# Patient Record
Sex: FEMALE | Race: WHITE
[De-identification: names, ages, dates, MRNs, and addresses within clinical notes are randomized per-mention and may not be internally consistent; named-entity substitution may affect disease eponyms.]

---

## 2018-10-22 ENCOUNTER — HOSPITAL ENCOUNTER (OUTPATIENT)
Dept: HOSPITAL 53 - M RAD | Age: 43
End: 2018-10-22
Attending: INTERNAL MEDICINE
Payer: COMMERCIAL

## 2018-10-22 DIAGNOSIS — R93.5: Primary | ICD-10-CM

## 2018-10-22 DIAGNOSIS — R11.2: ICD-10-CM

## 2018-10-22 PROCEDURE — 76700 US EXAM ABDOM COMPLETE: CPT

## 2018-10-26 ENCOUNTER — HOSPITAL ENCOUNTER (OUTPATIENT)
Dept: HOSPITAL 53 - M LAB | Age: 43
End: 2018-10-26
Attending: INTERNAL MEDICINE
Payer: COMMERCIAL

## 2018-10-26 DIAGNOSIS — R11.2: Primary | ICD-10-CM

## 2018-10-26 LAB
ALBUMIN/GLOBULIN RATIO: 1 (ref 1–1.93)
ALBUMIN: 3.6 GM/DL (ref 3.2–5.2)
ALKALINE PHOSPHATASE: 101 U/L (ref 45–117)
ALT SERPL W P-5'-P-CCNC: 31 U/L (ref 12–78)
AST SERPL-CCNC: 18 U/L (ref 7–37)
BILIRUB CONJ SERPL-MCNC: < 0.1 MG/DL (ref 0–0.2)
BILIRUBIN,TOTAL: 0.2 MG/DL (ref 0.2–1)
CONTROL LINE HPYORI: (no result)
H PYLORI QUALITATIVE IGG: NEGATIVE
TOTAL PROTEIN: 7.2 GM/DL (ref 6.4–8.2)

## 2018-10-26 PROCEDURE — 80076 HEPATIC FUNCTION PANEL: CPT

## 2018-10-29 ENCOUNTER — HOSPITAL ENCOUNTER (OUTPATIENT)
Dept: HOSPITAL 53 - M OPP | Age: 43
Discharge: HOME | End: 2018-10-29
Attending: INTERNAL MEDICINE
Payer: COMMERCIAL

## 2018-10-29 DIAGNOSIS — N32.81: ICD-10-CM

## 2018-10-29 DIAGNOSIS — Z80.3: ICD-10-CM

## 2018-10-29 DIAGNOSIS — M54.9: ICD-10-CM

## 2018-10-29 DIAGNOSIS — F17.210: ICD-10-CM

## 2018-10-29 DIAGNOSIS — R12: ICD-10-CM

## 2018-10-29 DIAGNOSIS — D64.9: ICD-10-CM

## 2018-10-29 DIAGNOSIS — G43.909: ICD-10-CM

## 2018-10-29 DIAGNOSIS — R10.11: ICD-10-CM

## 2018-10-29 DIAGNOSIS — Z80.0: ICD-10-CM

## 2018-10-29 DIAGNOSIS — K29.70: ICD-10-CM

## 2018-10-29 DIAGNOSIS — K21.9: ICD-10-CM

## 2018-10-29 DIAGNOSIS — Z79.899: ICD-10-CM

## 2018-10-29 DIAGNOSIS — R11.2: Primary | ICD-10-CM

## 2018-10-29 DIAGNOSIS — F41.9: ICD-10-CM

## 2018-10-29 PROCEDURE — 43239 EGD BIOPSY SINGLE/MULTIPLE: CPT

## 2018-10-29 RX ADMIN — SODIUM CHLORIDE 1 MLS/HR: 9 INJECTION, SOLUTION INTRAVENOUS at 13:00

## 2018-10-30 LAB
H PYLORI IGM SER-ACNC: 13.9 UNITS (ref 0–8.9)
IGA SERPL-MCNC: 212 MG/DL (ref 87–352)
IGA1 SER-MCNC: 178 MG/DL (ref 73.2–301.2)
IGA2 SER-MCNC: 73 MG/DL (ref 13.4–97.9)

## 2019-01-10 ENCOUNTER — HOSPITAL ENCOUNTER (OUTPATIENT)
Dept: HOSPITAL 53 - M LAB | Age: 44
End: 2019-01-10
Attending: INTERNAL MEDICINE
Payer: COMMERCIAL

## 2019-01-10 DIAGNOSIS — K52.9: Primary | ICD-10-CM

## 2019-01-10 DIAGNOSIS — B96.81: ICD-10-CM

## 2019-02-19 ENCOUNTER — HOSPITAL ENCOUNTER (OUTPATIENT)
Dept: HOSPITAL 53 - M SFHCCAPE | Age: 44
End: 2019-02-19
Attending: PHYSICIAN ASSISTANT
Payer: COMMERCIAL

## 2019-02-19 DIAGNOSIS — E78.1: Primary | ICD-10-CM

## 2019-02-19 LAB
ALBUMIN SERPL BCG-MCNC: 3.3 GM/DL (ref 3.2–5.2)
ALT SERPL W P-5'-P-CCNC: 23 U/L (ref 12–78)
BASOPHILS # BLD AUTO: 0.1 10^3/UL (ref 0–0.2)
BASOPHILS NFR BLD AUTO: 0.9 % (ref 0–1)
BILIRUB SERPL-MCNC: 0.4 MG/DL (ref 0.2–1)
BUN SERPL-MCNC: 9 MG/DL (ref 7–18)
CALCIUM SERPL-MCNC: 8 MG/DL (ref 8.5–10.1)
CHLORIDE SERPL-SCNC: 106 MEQ/L (ref 98–107)
CHOLEST SERPL-MCNC: 196 MG/DL (ref ?–200)
CHOLEST/HDLC SERPL: 5.44 {RATIO} (ref ?–5)
CO2 SERPL-SCNC: 26 MEQ/L (ref 21–32)
CREAT SERPL-MCNC: 0.66 MG/DL (ref 0.55–1.3)
EOSINOPHIL # BLD AUTO: 0.3 10^3/UL (ref 0–0.5)
EOSINOPHIL NFR BLD AUTO: 4.3 % (ref 0–3)
GFR SERPL CREATININE-BSD FRML MDRD: > 60 ML/MIN/{1.73_M2} (ref 58–?)
GLUCOSE SERPL-MCNC: 89 MG/DL (ref 70–100)
HCT VFR BLD AUTO: 33.6 % (ref 36–47)
HDLC SERPL-MCNC: 36 MG/DL (ref 40–?)
HGB BLD-MCNC: 10.6 G/DL (ref 12–15.5)
LDLC SERPL CALC-MCNC: 105 MG/DL (ref ?–100)
LYMPHOCYTES # BLD AUTO: 2.7 10^3/UL (ref 1.5–4.5)
LYMPHOCYTES NFR BLD AUTO: 38.5 % (ref 24–44)
MCH RBC QN AUTO: 26.8 PG (ref 27–33)
MCHC RBC AUTO-ENTMCNC: 31.5 G/DL (ref 32–36.5)
MCV RBC AUTO: 85.1 FL (ref 80–96)
MONOCYTES # BLD AUTO: 0.5 10^3/UL (ref 0–0.8)
MONOCYTES NFR BLD AUTO: 7.8 % (ref 0–5)
NEUTROPHILS # BLD AUTO: 3.4 10^3/UL (ref 1.8–7.7)
NEUTROPHILS NFR BLD AUTO: 48.4 % (ref 36–66)
NONHDLC SERPL-MCNC: 160 MG/DL
PLATELET # BLD AUTO: 236 10^3/UL (ref 150–450)
POTASSIUM SERPL-SCNC: 4.2 MEQ/L (ref 3.5–5.1)
PROT SERPL-MCNC: 6.9 GM/DL (ref 6.4–8.2)
RBC # BLD AUTO: 3.95 10^6/UL (ref 4–5.4)
SODIUM SERPL-SCNC: 138 MEQ/L (ref 136–145)
TRIGL SERPL-MCNC: 275 MG/DL (ref ?–150)
TSH SERPL DL<=0.005 MIU/L-ACNC: 1.61 UIU/ML (ref 0.36–3.74)
WBC # BLD AUTO: 7 10^3/UL (ref 4–10)

## 2019-03-05 ENCOUNTER — HOSPITAL ENCOUNTER (OUTPATIENT)
Dept: HOSPITAL 53 - M RAD | Age: 44
End: 2019-03-05
Attending: PHYSICIAN ASSISTANT
Payer: COMMERCIAL

## 2019-03-05 DIAGNOSIS — M51.27: ICD-10-CM

## 2019-03-05 DIAGNOSIS — M50.20: Primary | ICD-10-CM

## 2019-03-06 NOTE — REP
MRI lumbar spine without contrast:

 

History:  Low back pain, left side.  No known injury.  No comparison imaging.

 

Technique:  Sagittal and axial T1 and T2-weighted scans are acquired in the usual

fashion with and without fat saturation.  Sequences include spin echo, turbo

spin-echo, and STIR imaging sequences.

 

MRI findings:  Lumbar vertebral body heights are preserved and alignment is

normal.  There is no evidence of spondylolysis or spondylolisthesis.  Cortical

and medullary bone signal intensity are normal.  Conus medullaris is normal in

position and appearance at L1.  No extra vertebral abnormality is observed.

 

At L4-5, there is mild degenerative narrowing of the disc space.  Axial and

sagittal images show diffuse disc bulging indenting the ventral margin of the

thecal sac.  Mild facet and ligamentum flavum hypertrophy is seen.  Canal size is

mildly narrowed at this level, the mid AP dimension of the thecal sac at L4-5 is

9.7 mm.  No neural foraminal narrowing is seen.

 

At L5-S1, there is a small left foraminal disc protrusion.  There is left

posterior disc bulging as well.  There is minimal thecal sac compression.

 

At L3-4, there is no significant abnormality.

 

At L2-3, there is slight narrowing of the disc and mild diffuse disc bulging is

seen.  No central canal stenosis or neural foraminal encroachment is appreciated.

 

 

At L1-2, no abnormality is seen.

 

Impression:

 

Mild degenerative spondylosis changes at L4-5 with mild central canal stenosis.

There is a left foraminal disc protrusion which is small at L5-S1.

 

 

Electronically Signed by

Ajit Myles MD 03/06/2019 10:59 A

## 2019-03-06 NOTE — REP
MRI of the cervical spine without contrast:

 

History:  Neck pain.  No comparison imaging.

 

Technique:  Sagittal and axial T1 and T2-weighted scans are acquired in the usual

fashion with and without fat saturation.  Sequences include spin echo, turbo

spin-echo, and STIR imaging sequences.

 

MRI findings:  There is straightening of the normal cervical lordosis.  Cervical

vertebral body heights are preserved.  Cortical and medullary bone signal

intensity are normal.  There is degenerative disc disease at each level to some

degree from C3-4 through C6-7.  Cervical cord is normal in coarse, caliber and

signal intensity on T1 and T2-weighted scans.  Craniocervical junction is

unremarkable.

 

Axial and sagittal images taken at the C2-3 level show minimal central disc

bulging.  No other finding.

 

At C3-4, there is also a small central disc bulge.

 

At C4-5, there is a small focal disc protrusion in a left paracentral position

which effaces the ventral margin of the thecal sac and indents the ventral margin

of the cord.  Canal size is borderline.  Midline AP dimension of the thecal sac

is 10 mm.  No neural foraminal encroachment is appreciated.

 

At C5-6, there is a broad-based right posterior disc protrusion with some

associated discogenic spurring.  This produces central canal stenosis and mild

cord compression.  No increased signal intensity is seen in the substance of the

cord.  There is right-sided uncovertebral spurring producing neural foraminal

encroachment.  Mild left-sided neural foraminal spurring is seen as well.

 

At C6-7, there is minimal disc bulging.  No other finding.

 

The C7-T1 disc level shows no abnormality.

 

Impression:

 

Degenerative spondylosis changes.  The dominant abnormality is at C5-6 where

there is mild to moderate cord compression and central canal stenosis due to a

right posterior broad-based disc protrusion.  Bilateral uncovertebral spurring is

seen at this level.

 

 

Electronically Signed by

Ajit Myles MD 03/06/2019 09:35 A